# Patient Record
Sex: MALE
[De-identification: names, ages, dates, MRNs, and addresses within clinical notes are randomized per-mention and may not be internally consistent; named-entity substitution may affect disease eponyms.]

---

## 2017-11-14 ENCOUNTER — HOSPITAL ENCOUNTER (EMERGENCY)
Dept: HOSPITAL 25 - UCEAST | Age: 22
Discharge: HOME | End: 2017-11-14
Payer: COMMERCIAL

## 2017-11-14 VITALS — DIASTOLIC BLOOD PRESSURE: 65 MMHG | SYSTOLIC BLOOD PRESSURE: 153 MMHG

## 2017-11-14 DIAGNOSIS — F41.9: ICD-10-CM

## 2017-11-14 DIAGNOSIS — J02.0: Primary | ICD-10-CM

## 2017-11-14 DIAGNOSIS — F98.8: ICD-10-CM

## 2017-11-14 DIAGNOSIS — F12.90: ICD-10-CM

## 2017-11-14 DIAGNOSIS — Z87.891: ICD-10-CM

## 2017-11-14 PROCEDURE — G0463 HOSPITAL OUTPT CLINIC VISIT: HCPCS

## 2017-11-14 PROCEDURE — 99202 OFFICE O/P NEW SF 15 MIN: CPT

## 2017-11-14 PROCEDURE — 87651 STREP A DNA AMP PROBE: CPT

## 2017-11-14 NOTE — UC
Ivette SZYMANSKI Nilda, scribed for Shagufta Marquez MD on 11/14/17 at 1947 .





Throat Pain/Nasal Anand HPI





- HPI Summary


HPI Summary: 


This patient is a 22 year old M [place] [facility]. 

Patient reports cold symptoms that began 1 week ago but resolved. Yesterday he 

began to have a sore throat. The patient rates the pain 3/10 in severity. 

Symptoms aggravated by swallowing, and alleviated by OTC medications. Patient 

reports minor HA, difficulty swallowing, ear pressure, cough, and sinus 

congestion. He denies fever, N/V, rash, and burning with urination. Recent sick 

contact with girlfriend who was just diagnosed with strep throat.  No analgesia 

today. No drooling. + po





 Patients medication reviewed this visit. 





- History of Current Complaint


Chief Complaint: UCGeneralIllness


Stated Complaint: SORE THROAT


Hx Obtained From: Patient


Onset/Duration: Sudden Onset, Lasting Days, Still Present


Severity: Mild


Pain Intensity: 3


Pain Scale Used: 0-10 Numeric


Cough: Nonproductive


Associated Signs & Symptoms: Positive: Other - minor HA, ear pressure, cough, 

and sinus congestion. He denies fever, N/V, rash, and burning with urination.





- Allergies/Home Medications


Allergies/Adverse Reactions: 


 Allergies











Allergy/AdvReac Type Severity Reaction Status Date / Time


 


salmon Allergy  Rash Uncoded 11/14/17 19:24











Home Medications: 


 Home Medications





Amphetamine-Dextroamphetamine [Adderall 10 mg-] 20 mg PO DAILY 11/14/17 [

History Confirmed 11/14/17]











PMH/Surg Hx/FS Hx/Imm Hx


Previously Healthy: Yes


Psychological History: Anxiety, Other


Other Psychological History: ADD





- Surgical History


Surgical History: None





- Family History


Known Family History: Positive: Cardiac Disease





- Social History


Occupation: Student


Lives: Dormitory/Roommates


Alcohol Use: Occasionally


Substance Use Type: Marijuana


Smoking Status (MU): Former Smoker





Review of Systems


Constitutional: Other - negative fever


Skin: Other - negative rash


ENT: Sore Throat, Ear Ache, Sinus Congestion, Other - difficulty swallowing


Respiratory: Cough


Gastrointestinal: Other - negative N/V


Genitourinary: Other - negative burning with urination


Neurological: Headache


All Other Systems Reviewed And Are Negative: Yes





Physical Exam


Triage Information Reviewed: Yes


Appearance: Well-Appearing, No Pain Distress, Well-Nourished


Vital Signs: 


 Initial Vital Signs











Temp  98 F   11/14/17 19:25


 


Pulse  76   11/14/17 19:25


 


Resp  16   11/14/17 19:25


 


BP  153/65   11/14/17 19:25


 


Pulse Ox  100   11/14/17 19:25











Vital Signs Reviewed: Yes


Eye Exam: Normal


Eyes: Positive: Conjunctiva Clear.  Negative: Discharge


ENT: Positive: Tonsillar swelling.  Negative: TMs normal - scant fluid left TM  

no erythema, no retraction turbinates inflammed and boggy + PND uvula midline 

mild erythema no exudate no erythema, Tonsillar exudate


Dental Exam: Normal


Neck exam: Normal


Neck: Positive: Supple, Nontender.  Negative: No Lymphadenopathy - mild 

submandibular LA  R>L


Respiratory Exam: Normal


Respiratory: Positive: Chest non-tender, Lungs clear, Normal breath sounds, No 

respiratory distress, No accessory muscle use


Cardiovascular Exam: Normal


Cardiovascular: Positive: RRR, No Murmur, Pulses Normal


Abdominal Exam: Normal


Abdomen Description: Positive: Nontender, No Organomegaly, Soft


Bowel Sounds: Positive: Present


Musculoskeletal Exam: Normal


Musculoskeletal: Positive: Strength Intact


Neurological Exam: Normal


Neurological: Positive: Alert


Psychological Exam: Normal


Psychological: Positive: Normal Response To Family





Re-Evaluation





- Re-Evaluation


  ** First Eval


Re-Evaluation Time: 20:01


Change: Improved - Pt accepting Rx lidocaine discussed secretion precaution 

gargle spit apap/motrin





Throat Pain/Nasal Course/Dx





- Course


Assessment/Plan: This patient is a 22 year old M [place] [facility] 

[place]rep [place]. Patient reports cold symptoms that began 1 week ago but resolved. 

Yesterday he began to have a sore throat. The patient rates the pain 3/10 in 

severity. Symptoms aggravated by swallowing, and alleviated by OTC medications. 

Patient reports minor HA, difficulty swallowing, ear pressure, cough, and sinus 

congestion. He denies fever, N/V, rash, and burning with urination. Recent sick 

contact with girlfriend who was just diagnosed with strep throat. Patients 

medication reviewed this visit. + strep test. Pt given viscous lidocaine in UC. 

Pt is stable and will be D/C with a prescription for azithromycin and a 

diagnosis of strep throat.





- Differential Dx/Diagnosis


Provider Diagnoses: strep pharyngitis





Discharge





- Discharge Plan


Condition: Stable


Disposition: HOME


Prescriptions: 


Azithromycin TAB* [Zithromax TAB (Z-PEPITO) 250 mg #6 tabs] 2 tab PO .TODAY, THEN 

1 DAILY #1 pepito


Lidocaine 2% VISCOUS* 10 ml MT Q6HR PRN #100 btl


 PRN Reason: throat pain


Patient Education Materials:  Strep Throat (ED)


Referrals: 


[facility] [Outside]


Additional Instructions: 


- Stay well hydrated. Drink plenty of non-alcoholic, non-caffinated beverages.


- Gargle with warm, salt water 2-3 times a day


-Take antibiotics as prescribed until gone


-Cold beverages may be soothing to your throat  - popsicles, apple sauce, jello


- After you have been on antibiotics for 2 days - change your toothbrush and 

your pillowcase. These infections are spread by secretions - do NOT share 

eating or drinking utensils - clean items you share with other people such as 

cell phones, computer mouse, TV remote, computer tablets,  etc


- Alternate ibuprofen (Advil, Motrin) 600mg and Tylenol every 3 hours for pain 

or fever.  Take with food. Do NOT take for more than 4-5 days. 


- Okay to gargle and spit with numbing medication as prescribed


- Get plenty of restful sleep


- Contact the health center, return here, or go to the emergency department 

with questions or concerns











The documentation as recorded by the Ivette black Nilda accurately 

reflects the service I personally performed and the decisions made by me, 

Shagufta Marquez MD.

## 2018-08-31 ENCOUNTER — HOSPITAL ENCOUNTER (EMERGENCY)
Dept: HOSPITAL 25 - ED | Age: 23
Discharge: HOME | End: 2018-08-31
Payer: COMMERCIAL

## 2018-08-31 VITALS — DIASTOLIC BLOOD PRESSURE: 76 MMHG | SYSTOLIC BLOOD PRESSURE: 123 MMHG

## 2018-08-31 DIAGNOSIS — Z87.891: ICD-10-CM

## 2018-08-31 DIAGNOSIS — M54.41: Primary | ICD-10-CM

## 2018-08-31 PROCEDURE — 99282 EMERGENCY DEPT VISIT SF MDM: CPT

## 2018-09-01 NOTE — ED
Back Pain





- HPI Summary


HPI Summary: 


Patient states that approximately 3 months ago while running on a treadmill he 

felt a sudden pain to his right lower back.  He kept running on the treadmill 

and states it dissipated fairly quickly.  He now has been endorsing pain which 

has been intermittent over the past 3 months to his right low back radiating to 

the buttocks and the posterior right leg.  This is not worse or better with 

positioning.  He remains ambulatory.  He has been taking ibuprofen with minimal 

relief.  He has not seen a PCP or other provider for this in the past.  Denies 

any trauma or injury otherwise.








- History of Current Complaint


Chief Complaint: EDBackInjuryPain


Stated Complaint: BACK PAIN


Time Seen by Provider: 08/31/18 14:52


Hx Obtained From: Patient


Onset/Duration: Gradual Onset


Onset/Duration: Still Present - months


Timing: Constant


Back Pain Location: Is Discrete @ - right sciatic pain


Severity Initially: Moderate


Severity Currently: Moderate


Pain Intensity: 4


Pain Scale Used: 0-10 Numeric


Character: Aching


Aggravating Symptom(s): Lifting, Bending


Alleviating Symptom(s): Rest, Position


Associated Signs And Symptoms: Negative: Swelling, Redness, Bruising, Weakness, 

Numbness, Abdominal Pain, Bladder Incontinence, Bowel Incontinence, Weight Loss

, Pain with Weight Bearing





- Risk Factors


AAA Risk Factors: Negative


TAD Risk Factors: Negative


Cauda Equina Risk Factors: Negative


Epidural Abscess Risk Factors: Negative





- Allergies/Home Medications


Allergies/Adverse Reactions: 


 Allergies











Allergy/AdvReac Type Severity Reaction Status Date / Time


 


salmon Allergy  Rash Uncoded 08/31/18 13:08














PMH/Surg Hx/FS Hx/Imm Hx


Previously Healthy: Yes





- Immunization History


Hx Pertussis Vaccination: No


Immunizations Up to Date: Yes


Infectious Disease History: No


Infectious Disease History: 


   Denies: Hx Clostridium Difficile, Hx Hepatitis, Hx Human Immunodeficiency 

Virus (HIV), Hx of Known/Suspected MRSA, Hx Shingles, Hx Tuberculosis, Hx Known/

Suspected VRE, Hx Known/Suspected VRSA, History Other Infectious Disease, 

Traveled Outside the [place] in Last 30 Days





- Family History


Known Family History: Positive: Cardiac Disease





- Social History


Occupation: Unemployed


Lives: With Family


Alcohol Use: Occasionally


Hx Substance Use: Yes


Substance Use Type: Reports: Marijuana


Hx Tobacco Use: No


Smoking Status (MU): Former Smoker





Review of Systems





- ROS Summary


Review of Systems Summary: 


Now


Negative: Fever, Chills, Fatigue, Skin Diaphoresis


Negative: Palpitations, Chest Pain


Negative: Shortness Of Breath, Cough


Genitourinary: Negative


Positive: no symptoms reported, see HPI


Positive: Arthralgia.  Negative: Myalgia


Skin: Negative


Neurological: Negative


All Other Systems Reviewed And Are Negative: Yes





Physical Exam


Triage Information Reviewed: Yes


Vital Signs On Initial Exam: 


 Initial Vitals











Temp Pulse Resp BP Pulse Ox


 


 99 F   74   19   138/66   96 


 


 08/31/18 13:03  08/31/18 13:03  08/31/18 13:03  08/31/18 13:03  08/31/18 13:03











Vital Signs Reviewed: Yes


Appearance: Positive: Well-Appearing, Well-Nourished


Skin: Positive: Warm, Skin Color Reflects Adequate Perfusion


Head/Face: Positive: Normal Head/Face Inspection


Eyes: Positive: EOMI, DOUG, Conjunctiva Clear


Neck: Positive: Supple, No Lymphadenopathy


Respiratory/Lung Sounds: Positive: Clear to Auscultation, Breath Sounds Present


Cardiovascular: Positive: RRR, Pulses are Symmetrical in both Upper and Lower 

Extremities


Musculoskeletal: Positive: Normal, Strength/ROM Intact, Pain @ - right lower 

back over sciatic notch.  Negative: Edema Left, Edema Right


Neurological: Positive: Speech Normal


Psychiatric: Positive: Normal


AVPU Assessment: Alert





Diagnostics





- Vital Signs


 Vital Signs











  Temp Pulse Resp BP Pulse Ox


 


 08/31/18 17:33  98.2 F  73  12  123/76  96


 


 08/31/18 13:03  99 F  74  19  138/66  96














- Laboratory


Lab Statement: Any lab studies that have been ordered have been reviewed, and 

results considered in the medical decision making process.





Back Pain Course/Dx





- Course


Course Of Treatment: On physical examination, there is tenderness over the 

right sciatic notch radiating to the right buttock.  Denies any numbness or 

tingling.  Denies any foot drop.  Denies bladder or bowel dysfunction.  No 

weakness noted in the lower extremities on physical exam.  Patient is 

ambulating well.  Discussed with patient gentle low back stretch exercises, 

moist heat, ibuprofen as well as Flexeril to deal with symptoms of sciatic nerve

/lumbar radiculopathy pain.  He agrees to try this as first line.  He states if 

symptoms do not improve he will follow-up with his PCP.  He is also encouraged 

massage.  Deferred any imaging at this time as he does not have any spine 

tenderness.





- Diagnoses


Differential Diagnosis/HQI/PQRI: Positive: Strain, Sprain


Provider Diagnoses: 


 Sciatica








Discharge





- Sign-Out/Discharge


Documenting (check all that apply): Patient Departure





- Discharge Plan


Condition: Stable


Disposition: HOME


Prescriptions: 


Cyclobenzaprine TAB* [Flexeril TAB*] 10 mg PO BID PRN #14 tab


 PRN Reason: Spasms


Patient Education Materials:  Sciatica (ED), Lumbar Radiculopathy (ED), Lower 

Back Exercises (ED)


Referrals: 


No Primary Care Phys,NOPCP [Primary Care Provider] - 


Additional Instructions: 


Ibuprofen 600mg three times daily for discomfort- do not exceed 5 days


Flexeril up to twice daily as needed for muscle pain


Moist heat to the area several times per day


Gentle stretches (as shown)


Also use tennis ball in the sciatic notch while lying the floor


Massage


Chiropractor care may help








- Billing Disposition and Condition


Condition: STABLE


Disposition: Home

## 2020-01-22 ENCOUNTER — HOSPITAL ENCOUNTER (EMERGENCY)
Dept: HOSPITAL 25 - UCEAST | Age: 25
Discharge: HOME | End: 2020-01-22
Payer: COMMERCIAL

## 2020-01-22 VITALS — DIASTOLIC BLOOD PRESSURE: 81 MMHG | SYSTOLIC BLOOD PRESSURE: 130 MMHG

## 2020-01-22 DIAGNOSIS — Z87.891: ICD-10-CM

## 2020-01-22 DIAGNOSIS — Z91.013: ICD-10-CM

## 2020-01-22 DIAGNOSIS — J02.9: Primary | ICD-10-CM

## 2020-01-22 PROCEDURE — G0463 HOSPITAL OUTPT CLINIC VISIT: HCPCS

## 2020-01-22 PROCEDURE — 87651 STREP A DNA AMP PROBE: CPT

## 2020-01-22 PROCEDURE — 99211 OFF/OP EST MAY X REQ PHY/QHP: CPT

## 2020-01-22 NOTE — UC
Throat Pain/Nasal Anand HPI





- HPI Summary


HPI Summary: 


24-year-old male presents with complaints of 2 days of sore throat and 

headache.  Denies fever, chills, nasal congestion, ear pain, dysphagia, cough, 

chest pain, shortness of breath, abdominal pain, nausea, or vomiting.








- History of Current Complaint


Chief Complaint: UCRespiratory


Stated Complaint: THROAT PAIN


Time Seen by Provider: 01/22/20 17:31


Hx Obtained From: Patient


Pain Intensity: 0





- Allergies/Home Medications


Allergies/Adverse Reactions: 


 Allergies











Allergy/AdvReac Type Severity Reaction Status Date / Time


 


salmon Allergy  Rash Uncoded 01/22/20 17:54











Home Medications: 


 Home Medications





NK [No Home Medications Reported]  01/22/20 [History Confirmed 01/22/20]











PMH/Surg Hx/FS Hx/Imm Hx


Previously Healthy: Yes - Denies significant PMH





- Surgical History


Surgical History: None





- Family History


Known Family History: Positive: Cardiac Disease





- Social History


Occupation: Student


Lives: Dormitory/Roommates


Alcohol Use: Occasionally


Substance Use Type: Marijuana


Smoking Status (MU): Former Smoker





Review of Systems


All Other Systems Reviewed And Are Negative: Yes


Constitutional: Negative: Fever, Chills


Eyes: Negative: Drainage, Eye Redness


ENT: Positive: Sore Throat.  Negative: Ear Ache, Nasal Discharge, Sinus 

Congestion, Sinus Pain/Tenderness


Respiratory: Negative: Shortness Of Breath, Cough


Cardiovascular: Negative: Palpitations, Chest Pain


Gastrointestinal: Negative: Abdominal Pain, Vomiting, Nausea


Genitourinary: Positive: Negative


Musculoskeletal: Positive: Negative


Neurological: Positive: Negative


Is Patient Immunocompromised?: No





Physical Exam





- Summary


Physical Exam Summary: 


GENERAL APPEARANCE: Well developed, well nourished, alert and cooperative, and 

appears to be in no acute distress.





EYES: Conjunctiva clear. No drainage.





EARS: External auditory canals and tympanic membranes clear, hearing grossly 

intact.





NOSE: No nasal discharge.





THROAT: Pharyngeal edema. No tonsilar inflammation, swelling, exudate, or 

lesions. Uvula midline.





NECK: Neck supple, non-tender without lymphadenopathy.





CARDIAC: Normal S1 and S2. No S3, S4 or murmurs. Rhythm is regular. There is no 

peripheral edema, cyanosis or pallor. Extremities are warm and well perfused. 

Capillary refill is less than 2 seconds. Peripheral pulses intact.





LUNGS: Clear to auscultation without rales, rhonchi, wheezing or diminished 

breath sounds.





ABDOMEN: Positive bowel sounds. Soft, nondistended, nontender. No guarding or 

rebound. No masses or hepatosplenomegally.





MUSKULOSKELETAL: ROM intact to all extremities. No joint erythema or 

tenderness. Normal muscular development. Normal gait.





SKIN: Skin normal color, texture and turgor with no lesions or eruptions.





Triage Information Reviewed: Yes


Vital Signs: 


 Initial Vital Signs











Temp  99.5 F   01/22/20 17:56


 


Pulse  85   01/22/20 17:56


 


Resp  18   01/22/20 17:56


 


BP  130/81   01/22/20 17:56


 


Pulse Ox  95   01/22/20 17:56











Vital Signs Reviewed: Yes





Throat Pain/Nasal Course/Dx





- Course


Course Of Treatment: 


24-year-old male presents with complaints of 2 days of sore throat and 

headache.  Denies fever, chills, nasal congestion, ear pain, dysphagia, cough, 

chest pain, shortness of breath, abdominal pain, nausea, or vomiting.  

Afebrile.  Vital signs stable.  Patient had pharyngeal erythema without 

tonsillar swelling or exudate, no cervical lymphadenopathy, and remainder of 

exam was unremarkable.  Rapid strep test was negative.  Reviewed results with 

the patient.  Recommending symptomatic treatment for viral pharyngitis.  He is 

to follow-up with [facility] in 7 days if symptoms are not 

improving.  Anticipatory guidance warning symptoms are reviewed with the 

patient.  Verbalizes understanding and agrees to plan of care.








- Differential Dx/Diagnosis


Differential Diagnosis/HQI/PQRI: Mononucleosis, Peritonsillar Abscess, 

Pharyngitis, Tonsillitis, URI


Provider Diagnosis: 


 Viral pharyngitis








Discharge ED





- Sign-Out/Discharge


Documenting (check all that apply): Patient Departure


All imaging exams completed and their final reports reviewed: No Studies





- Discharge Plan


Condition: Stable


Disposition: HOME


Patient Education Materials:  Pharyngitis (ED)


Referrals: 


No Primary Care Phys,NOPCP [Primary Care Provider] - 


Additional Instructions: 


Your rapid strep test in the clinic today was negative. Your symptoms are 

likely from a viral infection. Viral infections do not respond to antibiotics 

and are limited to the treatment of symptoms. Viral infections typically run 

their course in 7-10 days.





Drink plenty of fluids to avoid dehydration especially if you are running any 

fever.





Use salt water gargles several times a day.





Take over the counter acetaminophen (Tylenol) or ibuprofen (Advil, Motrin) 

according to directions as needed for pain or fever.





You may also use Chloraseptic spray or Cepacol lonzenges according to 

directions which contain a numbing medication and can provide some temporary 

relief from your sore throat.





Return here or follow up with the [facility] in 7 days if symptoms 

persist.





Seek immediate medical attention in the emergency room if you have fever 

greater than 100.5 F despite taking acetaminophen or ibuprofen, are unable to 

swallow or develop drooling, are unable to open your mouth fully, are unable to 

eat or drink, have pain that is not relieved with over the counter pain 

medication, or have any difficulty breathing.





- Billing Disposition and Condition


Condition: STABLE


Disposition: Home